# Patient Record
Sex: FEMALE | Race: BLACK OR AFRICAN AMERICAN | NOT HISPANIC OR LATINO | Employment: FULL TIME | URBAN - METROPOLITAN AREA
[De-identification: names, ages, dates, MRNs, and addresses within clinical notes are randomized per-mention and may not be internally consistent; named-entity substitution may affect disease eponyms.]

---

## 2017-10-31 ENCOUNTER — TELEPHONE (OUTPATIENT)
Dept: OBSTETRICS AND GYNECOLOGY | Facility: HOSPITAL | Age: 62
End: 2017-10-31

## 2017-11-01 ENCOUNTER — TELEPHONE (OUTPATIENT)
Dept: ADMINISTRATIVE | Facility: HOSPITAL | Age: 62
End: 2017-11-01

## 2017-11-01 NOTE — TELEPHONE ENCOUNTER
Called patient who wasn't home. Asked her daughter to have the [facility] call [facility] to schedule a follow up appointment.

## 2018-05-18 ENCOUNTER — HOSPITAL ENCOUNTER (OUTPATIENT)
Dept: SLEEP MEDICINE | Facility: HOSPITAL | Age: 63
Discharge: HOME | End: 2018-05-18
Attending: STUDENT IN AN ORGANIZED HEALTH CARE EDUCATION/TRAINING PROGRAM
Payer: COMMERCIAL

## 2018-05-18 DIAGNOSIS — R29.818 SUSPECTED SLEEP APNEA: ICD-10-CM

## 2018-05-18 DIAGNOSIS — G47.33 OBSTRUCTIVE SLEEP APNEA (ADULT) (PEDIATRIC): ICD-10-CM

## 2018-05-18 PROCEDURE — 95810 POLYSOM 6/> YRS 4/> PARAM: CPT | Mod: 26,,, | Performed by: INTERNAL MEDICINE

## 2018-05-18 PROCEDURE — 95810 POLYSOM 6/> YRS 4/> PARAM: CPT

## 2018-06-08 ENCOUNTER — HOSPITAL ENCOUNTER (OUTPATIENT)
Dept: SLEEP MEDICINE | Facility: HOSPITAL | Age: 63
Discharge: HOME | End: 2018-06-08
Attending: STUDENT IN AN ORGANIZED HEALTH CARE EDUCATION/TRAINING PROGRAM
Payer: COMMERCIAL

## 2018-06-08 DIAGNOSIS — G47.33 OBSTRUCTIVE SLEEP APNEA (ADULT) (PEDIATRIC): ICD-10-CM

## 2018-06-08 PROCEDURE — 95811 POLYSOM 6/>YRS CPAP 4/> PARM: CPT

## 2018-06-08 PROCEDURE — 95811 POLYSOM 6/>YRS CPAP 4/> PARM: CPT | Mod: 26,,, | Performed by: INTERNAL MEDICINE

## 2018-07-25 ENCOUNTER — OFFICE VISIT (OUTPATIENT)
Dept: URGENT CARE | Facility: CLINIC | Age: 63
End: 2018-07-25
Payer: COMMERCIAL

## 2018-07-25 VITALS
OXYGEN SATURATION: 95 % | HEIGHT: 66 IN | SYSTOLIC BLOOD PRESSURE: 137 MMHG | TEMPERATURE: 98 F | BODY MASS INDEX: 37.12 KG/M2 | WEIGHT: 231 LBS | HEART RATE: 72 BPM | DIASTOLIC BLOOD PRESSURE: 77 MMHG

## 2018-07-25 DIAGNOSIS — R30.0 DYSURIA: Primary | ICD-10-CM

## 2018-07-25 DIAGNOSIS — E11.8 TYPE 2 DIABETES MELLITUS WITH COMPLICATION, WITHOUT LONG-TERM CURRENT USE OF INSULIN: ICD-10-CM

## 2018-07-25 DIAGNOSIS — R73.9 HYPERGLYCEMIA: ICD-10-CM

## 2018-07-25 LAB — GLUCOSE SERPL-MCNC: >500 MG/DL (ref 70–110)

## 2018-07-25 PROCEDURE — 96372 THER/PROPH/DIAG INJ SC/IM: CPT | Mod: S$GLB,,, | Performed by: INTERNAL MEDICINE

## 2018-07-25 PROCEDURE — 99213 OFFICE O/P EST LOW 20 MIN: CPT | Mod: 25,S$GLB,, | Performed by: INTERNAL MEDICINE

## 2018-07-25 PROCEDURE — 82948 REAGENT STRIP/BLOOD GLUCOSE: CPT | Mod: S$GLB,,, | Performed by: INTERNAL MEDICINE

## 2018-07-25 RX ORDER — GLYBURIDE 5 MG/1
5 TABLET ORAL
Qty: 90 TABLET | Refills: 12 | Status: SHIPPED | OUTPATIENT
Start: 2018-07-25 | End: 2019-07-11 | Stop reason: SDUPTHER

## 2018-07-26 NOTE — PROGRESS NOTES
"Subjective:       Patient ID: Tori Bailey is a 63 y.o. female.    Vitals:  height is 5' 6" (1.676 m) and weight is 104.8 kg (231 lb). Her oral temperature is 98.1 °F (36.7 °C). Her blood pressure is 137/77 and her pulse is 72. Her oxygen saturation is 95%.     Chief Complaint: Fatigue (blurred vision)    Pt states a doctor told her she wasn't diabetic so she stopped taking her medicine and now she is having blurred vision, leg spasms, and fatigue.as well as urinary frequency       Fatigue   This is a recurrent problem. Episode onset: 2 months ago. The problem occurs intermittently. The problem has been waxing and waning. Associated symptoms include abdominal pain, chills, fatigue and neck pain. Pertinent negatives include no chest pain, congestion, coughing, fever, headaches, joint swelling, nausea, numbness, rash, sore throat, vertigo or vomiting. She has tried nothing for the symptoms.     Review of Systems   Constitution: Positive for chills, fatigue and malaise/fatigue. Negative for fever.   HENT: Negative for congestion and sore throat.    Eyes: Positive for blurred vision.   Cardiovascular: Negative for chest pain.   Respiratory: Negative for cough and shortness of breath.    Skin: Negative for rash.   Musculoskeletal: Positive for neck pain. Negative for back pain, joint pain and joint swelling.   Gastrointestinal: Positive for abdominal pain. Negative for diarrhea, nausea and vomiting.   Neurological: Negative for dizziness, headaches, numbness and vertigo.   Psychiatric/Behavioral: The patient is not nervous/anxious.        Objective:      Physical Exam   Constitutional: She is oriented to person, place, and time. She appears well-developed and well-nourished. She is cooperative.  Non-toxic appearance. She does not appear ill. No distress.   HENT:   Head: Normocephalic and atraumatic.   Right Ear: Hearing, tympanic membrane, external ear and ear canal normal.   Left Ear: Hearing, tympanic membrane, " external ear and ear canal normal.   Nose: Nose normal. No mucosal edema, rhinorrhea or nasal deformity. No epistaxis. Right sinus exhibits no maxillary sinus tenderness and no frontal sinus tenderness. Left sinus exhibits no maxillary sinus tenderness and no frontal sinus tenderness.   Mouth/Throat: Uvula is midline, oropharynx is clear and moist and mucous membranes are normal. No trismus in the jaw. Normal dentition. No uvula swelling. No posterior oropharyngeal erythema.   Eyes: Conjunctivae and EOM are normal. Pupils are equal, round, and reactive to light. Right eye exhibits no discharge. No foreign body present in the right eye. Left eye exhibits no discharge. No foreign body present in the left eye. Right conjunctiva is not injected. Left conjunctiva is not injected. No scleral icterus. Right eye exhibits normal extraocular motion and no nystagmus. Left eye exhibits normal extraocular motion and no nystagmus.       Sclera clear bilat   Neck: Trachea normal, normal range of motion, full passive range of motion without pain and phonation normal. Neck supple.   Cardiovascular: Normal rate, regular rhythm, S1 normal, S2 normal, intact distal pulses and normal pulses.  Exam reveals no gallop.    Murmur heard.   Systolic murmur is present with a grade of 1/6   Pulses:       Carotid pulses are 2+ on the right side, and 2+ on the left side.       Radial pulses are 2+ on the right side, and 2+ on the left side.        Femoral pulses are 2+ on the right side, and 2+ on the left side.       Popliteal pulses are 2+ on the right side, and 2+ on the left side.        Dorsalis pedis pulses are 2+ on the right side, and 2+ on the left side.        Posterior tibial pulses are 2+ on the right side, and 2+ on the left side.   Pulmonary/Chest: Effort normal and breath sounds normal. No respiratory distress.   Abdominal: Soft. Normal appearance and bowel sounds are normal. She exhibits no distension, no pulsatile midline mass  and no mass. There is no tenderness.   Musculoskeletal: Normal range of motion. She exhibits no edema or deformity.   Neurological: She is alert and oriented to person, place, and time. She exhibits normal muscle tone. Coordination normal.   Skin: Skin is warm, dry and intact. She is not diaphoretic. No pallor.   Psychiatric: She has a normal mood and affect. Her speech is normal and behavior is normal. Judgment and thought content normal. Cognition and memory are normal.   Nursing note and vitals reviewed.      Assessment:       1. Dysuria    2. Hyperglycemia    3. Type 2 diabetes mellitus with complication, without long-term current use of insulin        Plan:         Dysuria    Hyperglycemia  -     POCT glucose  -     insulin regular injection 13 Units; Inject 13 Units into the skin one time.  -     glyBURIDE (DIABETA) 5 MG tablet; Take 1 tablet (5 mg total) by mouth daily with breakfast.  Dispense: 90 tablet; Refill: 12    Type 2 diabetes mellitus with complication, without long-term current use of insulin  -     glyBURIDE (DIABETA) 5 MG tablet; Take 1 tablet (5 mg total) by mouth daily with breakfast.  Dispense: 90 tablet; Refill: 12      Take meds do not stop glyburide insulin 13 U SQ

## 2018-07-26 NOTE — PATIENT INSTRUCTIONS
"  Diabetes with High Blood Sugar  You have been treated for high blood sugar (hyperglycemia). This may be because of an infection or other illness, eating too many sweets or starches, or not taking enough insulin.  Home care  Monitor and write down your blood sugar level at least twice a day. Do this before breakfast and before dinner. If you take insulin, record your routine insulin dose as well. Also record any additional doses required based on your sliding scale. Do this for the next 3 to 5 days.  High blood sugar may cause symptoms that you can learn to recognize, such as:  · Frequent urination  · Thirst  · Dizziness  · Headache  · Shortness of breath  · Breath that smells fruity  · Nausea or vomiting  · Abdominal pain  · Drowsiness or loss of consciousness  If you have high-blood-sugar symptoms, use a blood or urine test to find out what your blood sugar level is. If it is above your usual range, use the "sliding scale" regular insulin dose prescribed by your healthcare provider. If you were not given a range for your insulin dose, contact your healthcare provider for more advice.  Follow-up care  Follow up with your healthcare provider, or as advised. You may need to meet with your healthcare provider in the next week. You will likely review your blood sugar records together. You may also talk to your provider about adjusting your dose of insulin or other medicine for blood sugar.  When to seek medical advice  Call your healthcare provider right away if these occur:  · High blood sugar symptoms (Symptoms are described above.)  · Blood sugar over 300 mg/dl If you cant reach your healthcare provider, go to a hospital emergency room or urgent care center  Date Last Reviewed: 6/1/2016  © 8672-5726 [employer]. [address] [place] [address], [place] [address]. All rights reserved. This information is not intended as a substitute for professional medical care. Always follow your healthcare professional's " instructions.    Please return here or go to the Emergency Department for any concerns or worsening of condition.  If you were prescribed antibiotics, please take them to completion.  If you were prescribed a narcotic medication, do not drive or operate heavy equipment or machinery while taking these medications.  Please follow up with your primary care doctor or specialist as needed.    If you  smoke, please stop smoking.

## 2018-07-30 ENCOUNTER — HOSPITAL ENCOUNTER (OUTPATIENT)
Dept: PULMONOLOGY | Facility: HOSPITAL | Age: 63
Discharge: HOME | End: 2018-07-30
Attending: INTERNAL MEDICINE
Payer: COMMERCIAL

## 2018-07-30 DIAGNOSIS — J44.1 OBSTRUCTIVE CHRONIC BRONCHITIS WITH EXACERBATION: Primary | ICD-10-CM

## 2018-07-30 DIAGNOSIS — J44.1 OBSTRUCTIVE CHRONIC BRONCHITIS WITH EXACERBATION: ICD-10-CM

## 2018-07-30 PROCEDURE — 94060 EVALUATION OF WHEEZING: CPT

## 2018-07-30 PROCEDURE — 94729 DIFFUSING CAPACITY: CPT

## 2018-07-30 PROCEDURE — 94727 GAS DIL/WSHOT DETER LNG VOL: CPT

## 2018-07-30 PROCEDURE — 99900031 HC PATIENT EDUCATION (STAT)

## 2018-07-31 ENCOUNTER — TELEPHONE (OUTPATIENT)
Dept: URGENT CARE | Facility: CLINIC | Age: 63
End: 2018-07-31

## 2018-08-01 ENCOUNTER — TELEPHONE (OUTPATIENT)
Dept: ADMINISTRATIVE | Facility: HOSPITAL | Age: 63
End: 2018-08-01

## 2018-11-07 PROBLEM — E66.9 OBESITY (BMI 35.0-39.9 WITHOUT COMORBIDITY): Status: ACTIVE | Noted: 2018-11-07

## 2018-11-07 PROBLEM — K04.7 INFECTED DENTAL CARRIES: Status: ACTIVE | Noted: 2018-11-07

## 2018-11-07 PROBLEM — K02.9 INFECTED DENTAL CARRIES: Status: ACTIVE | Noted: 2018-11-07

## 2018-11-07 PROBLEM — R22.43 LOCALIZED SWELLING OF BOTH LOWER LEGS: Status: ACTIVE | Noted: 2018-11-07

## 2019-07-24 PROBLEM — M25.562 CHRONIC PAIN OF BOTH KNEES: Status: ACTIVE | Noted: 2019-07-24

## 2019-07-24 PROBLEM — M25.561 CHRONIC PAIN OF BOTH KNEES: Status: ACTIVE | Noted: 2019-07-24

## 2019-07-24 PROBLEM — G89.29 CHRONIC PAIN OF BOTH KNEES: Status: ACTIVE | Noted: 2019-07-24

## 2020-04-15 ENCOUNTER — PATIENT OUTREACH (OUTPATIENT)
Dept: ADMINISTRATIVE | Facility: HOSPITAL | Age: 65
End: 2020-04-15

## 2020-04-29 PROBLEM — E78.5 HYPERLIPIDEMIA: Status: ACTIVE | Noted: 2020-04-29

## 2021-01-13 PROBLEM — S90.851A FOREIGN BODY IN RIGHT FOOT: Status: ACTIVE | Noted: 2021-01-13

## 2021-05-05 ENCOUNTER — OFFICE VISIT (OUTPATIENT)
Dept: URGENT CARE | Facility: CLINIC | Age: 66
End: 2021-05-05
Payer: COMMERCIAL

## 2021-05-05 VITALS
HEIGHT: 64 IN | WEIGHT: 256 LBS | BODY MASS INDEX: 43.71 KG/M2 | HEART RATE: 97 BPM | DIASTOLIC BLOOD PRESSURE: 76 MMHG | TEMPERATURE: 102 F | RESPIRATION RATE: 16 BRPM | OXYGEN SATURATION: 98 % | SYSTOLIC BLOOD PRESSURE: 117 MMHG

## 2021-05-05 DIAGNOSIS — R50.9 FEVER, UNSPECIFIED FEVER CAUSE: ICD-10-CM

## 2021-05-05 DIAGNOSIS — U07.1 COVID-19 VIRUS DETECTED: ICD-10-CM

## 2021-05-05 DIAGNOSIS — E86.0 DEHYDRATION: ICD-10-CM

## 2021-05-05 DIAGNOSIS — B96.89 ACUTE BACTERIAL SINUSITIS: ICD-10-CM

## 2021-05-05 DIAGNOSIS — H65.02 ACUTE SEROUS OTITIS MEDIA OF LEFT EAR, RECURRENCE NOT SPECIFIED: Primary | ICD-10-CM

## 2021-05-05 DIAGNOSIS — J01.90 ACUTE BACTERIAL SINUSITIS: ICD-10-CM

## 2021-05-05 DIAGNOSIS — U07.1 COVID-19 VIRUS INFECTION: ICD-10-CM

## 2021-05-05 LAB
CTP QC/QA: YES
SARS-COV-2 RDRP RESP QL NAA+PROBE: POSITIVE

## 2021-05-05 PROCEDURE — 99214 OFFICE O/P EST MOD 30 MIN: CPT | Mod: CR,S$GLB,, | Performed by: INTERNAL MEDICINE

## 2021-05-05 PROCEDURE — U0002 COVID-19 LAB TEST NON-CDC: HCPCS | Mod: QW,CR,S$GLB, | Performed by: INTERNAL MEDICINE

## 2021-05-05 PROCEDURE — 99214 PR OFFICE/OUTPT VISIT, EST, LEVL IV, 30-39 MIN: ICD-10-PCS | Mod: CR,S$GLB,, | Performed by: INTERNAL MEDICINE

## 2021-05-05 PROCEDURE — U0002: ICD-10-PCS | Mod: QW,CR,S$GLB, | Performed by: INTERNAL MEDICINE

## 2021-05-05 RX ORDER — AMOXICILLIN AND CLAVULANATE POTASSIUM 875; 125 MG/1; MG/1
1 TABLET, FILM COATED ORAL 2 TIMES DAILY
Qty: 20 TABLET | Refills: 0 | Status: SHIPPED | OUTPATIENT
Start: 2021-05-05 | End: 2021-05-15

## 2021-05-05 RX ORDER — AZITHROMYCIN 250 MG/1
250 TABLET, FILM COATED ORAL DAILY
Qty: 6 TABLET | Refills: 0 | Status: SHIPPED | OUTPATIENT
Start: 2021-05-05 | End: 2021-05-10

## 2021-05-05 RX ORDER — PREDNISONE 10 MG/1
10 TABLET ORAL DAILY
Qty: 5 TABLET | Refills: 0 | Status: SHIPPED | OUTPATIENT
Start: 2021-05-05 | End: 2021-05-10

## 2021-05-06 ENCOUNTER — PATIENT MESSAGE (OUTPATIENT)
Dept: RESEARCH | Facility: HOSPITAL | Age: 66
End: 2021-05-06

## 2021-05-12 ENCOUNTER — OFFICE VISIT (OUTPATIENT)
Dept: URGENT CARE | Facility: CLINIC | Age: 66
End: 2021-05-12
Payer: COMMERCIAL

## 2021-05-12 VITALS
DIASTOLIC BLOOD PRESSURE: 89 MMHG | OXYGEN SATURATION: 98 % | WEIGHT: 256 LBS | BODY MASS INDEX: 43.71 KG/M2 | SYSTOLIC BLOOD PRESSURE: 136 MMHG | HEART RATE: 114 BPM | HEIGHT: 64 IN | TEMPERATURE: 98 F | RESPIRATION RATE: 20 BRPM

## 2021-05-12 DIAGNOSIS — R06.02 SHORTNESS OF BREATH: Primary | ICD-10-CM

## 2021-05-12 DIAGNOSIS — J06.9 UPPER RESPIRATORY TRACT INFECTION, UNSPECIFIED TYPE: ICD-10-CM

## 2021-05-12 PROCEDURE — 99213 OFFICE O/P EST LOW 20 MIN: CPT | Mod: S$GLB,,, | Performed by: INTERNAL MEDICINE

## 2021-05-12 PROCEDURE — 99213 PR OFFICE/OUTPT VISIT, EST, LEVL III, 20-29 MIN: ICD-10-PCS | Mod: S$GLB,,, | Performed by: INTERNAL MEDICINE

## 2021-05-12 RX ORDER — DEXAMETHASONE 4 MG/1
4 TABLET ORAL DAILY
Qty: 5 TABLET | Refills: 0 | Status: SHIPPED | OUTPATIENT
Start: 2021-05-12 | End: 2021-05-17

## 2021-07-01 ENCOUNTER — PATIENT MESSAGE (OUTPATIENT)
Dept: ADMINISTRATIVE | Facility: OTHER | Age: 66
End: 2021-07-01

## 2023-01-13 PROBLEM — I26.99 ACUTE PULMONARY EMBOLISM: Status: ACTIVE | Noted: 2023-01-13

## 2023-01-19 ENCOUNTER — PATIENT OUTREACH (OUTPATIENT)
Dept: ADMINISTRATIVE | Facility: CLINIC | Age: 68
End: 2023-01-19
Payer: MEDICARE

## 2023-01-23 PROBLEM — D68.59: Status: ACTIVE | Noted: 2023-01-23

## 2023-01-23 PROBLEM — D68.59 ANTITHROMBIN III DEFICIENCY: Status: ACTIVE | Noted: 2023-01-23

## 2023-03-09 ENCOUNTER — PATIENT OUTREACH (OUTPATIENT)
Dept: ADMINISTRATIVE | Facility: HOSPITAL | Age: 68
End: 2023-03-09
Payer: MEDICARE

## 2023-03-09 DIAGNOSIS — E11.9 TYPE 2 DIABETES MELLITUS WITHOUT OPHTHALMIC MANIFESTATIONS: Primary | ICD-10-CM

## 2023-03-13 PROBLEM — Z82.49 FAMILY HISTORY OF DEEP VENOUS THROMBOSIS: Status: ACTIVE | Noted: 2023-03-13

## 2023-03-13 PROBLEM — J96.11 CHRONIC HYPOXEMIC RESPIRATORY FAILURE: Status: ACTIVE | Noted: 2023-03-13

## 2023-03-13 PROBLEM — Z86.718 HISTORY OF MATERNAL DEEP VEIN THROMBOSIS (DVT): Status: ACTIVE | Noted: 2023-03-13

## 2023-03-13 PROBLEM — Z22.7 TB LUNG, LATENT: Status: ACTIVE | Noted: 2023-03-13

## 2023-03-13 PROBLEM — Z87.59 HISTORY OF MATERNAL DEEP VEIN THROMBOSIS (DVT): Status: ACTIVE | Noted: 2023-03-13

## 2023-04-24 PROBLEM — I26.99 ACUTE PULMONARY EMBOLISM: Status: RESOLVED | Noted: 2023-01-13 | Resolved: 2023-04-24

## 2023-06-12 PROBLEM — J96.11 CHRONIC HYPOXEMIC RESPIRATORY FAILURE: Status: RESOLVED | Noted: 2023-03-13 | Resolved: 2023-06-12

## 2023-11-27 ENCOUNTER — PATIENT OUTREACH (OUTPATIENT)
Dept: INTERNAL MEDICINE | Facility: CLINIC | Age: 68
End: 2023-11-27

## 2023-12-18 ENCOUNTER — PATIENT MESSAGE (OUTPATIENT)
Dept: ADMINISTRATIVE | Facility: HOSPITAL | Age: 68
End: 2023-12-18

## 2024-05-10 ENCOUNTER — PATIENT OUTREACH (OUTPATIENT)
Dept: ADMINISTRATIVE | Facility: HOSPITAL | Age: 69
End: 2024-05-10

## 2024-05-10 DIAGNOSIS — E11.9 TYPE 2 DIABETES MELLITUS WITHOUT OPHTHALMIC MANIFESTATIONS: Primary | ICD-10-CM

## 2024-06-11 PROBLEM — G47.34 NOCTURNAL HYPOXEMIA: Status: ACTIVE | Noted: 2024-06-11

## 2024-06-11 PROBLEM — I26.99 PULMONARY EMBOLISM: Status: ACTIVE | Noted: 2024-06-11

## 2024-09-16 PROBLEM — I26.99 PULMONARY EMBOLISM: Status: RESOLVED | Noted: 2024-06-11 | Resolved: 2024-09-16

## 2024-10-28 ENCOUNTER — PATIENT OUTREACH (OUTPATIENT)
Dept: ADMINISTRATIVE | Facility: HOSPITAL | Age: 69
End: 2024-10-28